# Patient Record
Sex: FEMALE | Race: WHITE | NOT HISPANIC OR LATINO | URBAN - METROPOLITAN AREA
[De-identification: names, ages, dates, MRNs, and addresses within clinical notes are randomized per-mention and may not be internally consistent; named-entity substitution may affect disease eponyms.]

---

## 2023-09-22 ENCOUNTER — OUTPATIENT (OUTPATIENT)
Dept: OUTPATIENT SERVICES | Facility: HOSPITAL | Age: 68
LOS: 1 days | Discharge: HOME | End: 2023-09-22
Payer: MEDICARE

## 2023-09-22 VITALS
TEMPERATURE: 96 F | OXYGEN SATURATION: 100 % | RESPIRATION RATE: 17 BRPM | HEIGHT: 57 IN | WEIGHT: 110.01 LBS | DIASTOLIC BLOOD PRESSURE: 72 MMHG | HEART RATE: 95 BPM | SYSTOLIC BLOOD PRESSURE: 170 MMHG

## 2023-09-22 VITALS — RESPIRATION RATE: 18 BRPM | DIASTOLIC BLOOD PRESSURE: 74 MMHG | HEART RATE: 94 BPM | SYSTOLIC BLOOD PRESSURE: 160 MMHG

## 2023-09-22 DIAGNOSIS — Z98.41 CATARACT EXTRACTION STATUS, RIGHT EYE: Chronic | ICD-10-CM

## 2023-09-22 DIAGNOSIS — H25.12 AGE-RELATED NUCLEAR CATARACT, LEFT EYE: ICD-10-CM

## 2023-09-22 DIAGNOSIS — Z47.1 AFTERCARE FOLLOWING JOINT REPLACEMENT SURGERY: Chronic | ICD-10-CM

## 2023-09-22 PROCEDURE — V2632: CPT

## 2023-09-22 NOTE — ASU DISCHARGE PLAN (ADULT/PEDIATRIC) - NS MD DC FALL RISK RISK
For information on Fall & Injury Prevention, visit: [email or web address] OR  [email or web address] OR  https://www.cdc.gov/steadi/patient.html

## 2023-09-22 NOTE — ASU PATIENT PROFILE, ADULT - NSICDXPASTSURGICALHX_GEN_ALL_CORE_FT
PAST SURGICAL HISTORY:  Aftercare following left shoulder joint replacement surgery x 5 with an infection    H/O right cataract extraction lens implant

## 2023-09-25 DIAGNOSIS — E78.00 PURE HYPERCHOLESTEROLEMIA, UNSPECIFIED: ICD-10-CM

## 2023-09-25 DIAGNOSIS — H25.89 OTHER AGE-RELATED CATARACT: ICD-10-CM

## 2023-09-25 DIAGNOSIS — Z88.1 ALLERGY STATUS TO OTHER ANTIBIOTIC AGENTS STATUS: ICD-10-CM

## 2023-09-25 DIAGNOSIS — M19.90 UNSPECIFIED OSTEOARTHRITIS, UNSPECIFIED SITE: ICD-10-CM

## 2023-09-25 DIAGNOSIS — Z91.048 OTHER NONMEDICINAL SUBSTANCE ALLERGY STATUS: ICD-10-CM

## 2023-09-25 DIAGNOSIS — Z88.2 ALLERGY STATUS TO SULFONAMIDES: ICD-10-CM

## 2024-01-04 NOTE — PRE-ANESTHESIA EVALUATION ADULT - RESPIRATORY RATE (BREATHS/MIN)
Continue Omeprazole 40 mg daily (take the medication first thing in the morning on an empty stomach, at least 30 minutes prior breakfast)  GERD diet and lifestyle changes  Mediterranean diet - encouraged patient to find free apps to help follow a healthy diet  Try to loose weight slowly with diet and exercise, about 10% of body weight within 6-12 months  OK to take Metamucil 1 heaping tablespoon with 12 oz of water daily (nightly) - take fiber at least 2 hours apart from other medications to prevent decreased absorption of other medications  Take GasX over the counter product for abdominal bloating and gas  Follow-up in the office in one year.   Call the office sooner with any GI complaints   Repeat colonoscopy/screening after 6/ 21/2028            Gastroesophageal Reflux Diet (GERD)    This guide has been prepared for your use by registered dietitians. If you have questions or concerns, please call the nearest [place] facility to contact a dietitian. Diet counseling is available to discuss your specific needs.    Why follow a diet for gastroesophageal reflux?  This diet, along with prescribed medication, should help prevent uncomfortable side effects, such as heartburn.     Important points to keep in mind  Stop smoking   Wear loose fitting clothes  Achieve and maintain a healthy weight  [place] small frequent meals   Sit or  an upright position during and for 45 to 60 minutes after eating   Try problem foods in small amounts as part of a meal  Avoid eating within 2 to 3 hours before bedtime   Raise the head of the bed 6-8 inches when sleeping Foods to limit or avoid  High-fat foods   Alcohol  Carbonated beverages   Chocolate   Citrus juices   Coffee and caffeinated beverages   Tomato products        FOOD GROUPS  USUALLY WELL TOLERATED  MAY CAUSE DISCOMFORT  TIPS      BREADS, CEREALS, RICE and PASTA  5-8 ounce equivalents per day     1 ounce equivalent =   1 slice of bread   1 cup ready-to-eat cereal  ½ cup  cooked cereal, rice, pasta  ½ bagel, bun, English muffin Plain (with or without whole grain flour) bread, rolls, crackers, cereals, rice, Barely, and plain pastas; pasta with low-fat cream sauce   [place] toast, muffins, biscuits, pancakes and waffles made with low-fat ingredients; bagels; corn tortillas   Fat-free crackers Breads and cereals made with high fat ingredients such as croissants, doughnuts, sweet rolls, muffins, biscuits and granola type cereals   Pasta served with cream sauces and tomato based sauces   High fat snacks Spread jellies and jams on breads instead of butter or margarine  Sprinkle low-fat cheese, such as part-skim ricotta or mozzarella, on pasta in place of cream or tomato sauce      VEGETABLES  2-3 cups per day  Fresh, frozen or canned vegetables   Baked, boiled and mashed potatoes without added fat fried or creamed vegetables, tomatoes and tomato products, onion, vegetable juices  [place] fried potatoes, potato chips Cook vegetables in broth or sprinkle with herbs to add flavor     FRUITS  1 ½ -2 cups per day    Fresh, frozen and canned fruits as tolerated   Fruit juices as tolerated  Baldev, grapefruit, oranges, pineapples, and tangerines   Citrus juices Include other sources of vitamin C, such as cantaloupe, potatoes and strawberries     MILK, YOGURT and CHEESE  3 cups per day     1 cup=   1 cup milk or yogurt  1 ½ oz. natural cheese   2 oz. processed cheese  Fat-free, low-fat and reduced fat milk, low-fat buttermilk    Low-fat and nonfat yogurt    Low-fat cheeses, cottage cheese  Whole milk, buttermilk made with whole milk, chocolate milk, chocolate shakes or drinks     Evaporated whole milk and cream     Regular cheeses  In recipes that call for higher fat items, such as whole milk or cream, replace with skim milk or low-fat cottage cheese     MEATS, FISH, POULTRY, DRY BEANS & PEAS, EGGS, & NUTS  5-7 ounce equivalents per day    1ounce =   1oz. cooked meat, poultry or fish  1 egg   ¼ cup  cooked dried beans   1Tbsp peanut butter  ½ oz. nuts or seeds  Lean beef, pork, lamb, veal, and poultry(without the skin): All fresh, frozen or canned fish packed in water; shellfish    Low-fat luncheon meats    Eggs(limit to 3-4 egg yolks weekly)    Dry beans and peas prepared without fat(includes fat-free refried beans)    Reduced fat peanut butter    tofu All fried, fatty, or heavily marbled meat, poultry or fish    Regular luncheon meats, including bologna, salami, pimento loaf; sausages, wieners    Dry beans and peas prepared with fat or high-fat meat; refried beans     Nuts and peanut butter Broil, roast, grill, or boil meats, poultry and fish instead of frying     Select or prepare meats in their natural juice instead of sauces or gravies.      FATS, SNACKS, SWEETS, CONDIMENTS and BEVERAGES   Use sparingly  Nonfat or low-fat dressings and mayonnaise; nonfat liquid or powdered cream substitutes; nonfat or reduced fat sour cream   Soups made with a vegetable broth base, lean meat, vegetables (except tomatoes) and low fat milk  Sherbet, fruit ice, gelatin, marta food cake, colin crackers, low-fat cookies, frozen yogurt, reduced fat ice cream, pudding, or baked custard made with low-fat milk or other low-fat milk and other low-fat or nonfat desserts  Sugar, honey, jams, jellies, molasses, syrups, hard candy and marshmallows  Decaffeinated coffee, non-mint tea  Salt, pepper, garlic, oregano, margaret, other spices and herbs.  Regular salad dressings, butter, margarine, oil, saldaña, gravy, regular sour cream, cream cheese  Regular cream and tomato-based soups  High fat snacks, such as chips and buttered popcorn  All other cakes, cookies, pies, ice cream, pastries, and doughnuts  Any deserts containing chocolate  Coconut, chocolate or cream-filled candy  Candy with nuts   Carbonated beverages, regular coffee, mint tea, and alcoholic beverages  Tomato-based sauces  Spearmint, peppermint, chili and jalapeno peppers, and  vinegar  Sprinkle seasonings, such as garlic, onion powder, or oregano on cooked in place of butter or margarine   Snack on fresh fruit instead of chips or cookies.      A registered dietitian can help  For a list of [facility] with a dietitian, please call [facility] toll free at [phone]         [phone]